# Patient Record
Sex: FEMALE | Race: WHITE | Employment: PART TIME | URBAN - METROPOLITAN AREA
[De-identification: names, ages, dates, MRNs, and addresses within clinical notes are randomized per-mention and may not be internally consistent; named-entity substitution may affect disease eponyms.]

---

## 2017-10-31 LAB — PAP-ABSTRACT: NORMAL

## 2018-10-15 ENCOUNTER — TRANSFERRED RECORDS (OUTPATIENT)
Dept: MULTI SPECIALTY CLINIC | Facility: CLINIC | Age: 23
End: 2018-10-15

## 2018-10-15 LAB
C TRACH DNA SPEC QL PROBE+SIG AMP: NEGATIVE
N GONORRHOEA DNA SPEC QL PROBE+SIG AMP: NEGATIVE
SPECIMEN DESCRIP: NORMAL
SPECIMEN DESCRIPTION: NORMAL

## 2019-08-22 ENCOUNTER — OFFICE VISIT (OUTPATIENT)
Dept: FAMILY MEDICINE | Facility: CLINIC | Age: 24
End: 2019-08-22
Payer: COMMERCIAL

## 2019-08-22 VITALS
WEIGHT: 245.75 LBS | DIASTOLIC BLOOD PRESSURE: 74 MMHG | SYSTOLIC BLOOD PRESSURE: 138 MMHG | BODY MASS INDEX: 33.29 KG/M2 | TEMPERATURE: 94.4 F | RESPIRATION RATE: 15 BRPM | OXYGEN SATURATION: 97 % | HEART RATE: 80 BPM | HEIGHT: 72 IN

## 2019-08-22 DIAGNOSIS — Z11.3 SCREEN FOR STD (SEXUALLY TRANSMITTED DISEASE): ICD-10-CM

## 2019-08-22 DIAGNOSIS — Z23 NEED FOR DIPHTHERIA-TETANUS-PERTUSSIS (TDAP) VACCINE: ICD-10-CM

## 2019-08-22 DIAGNOSIS — Z97.5 IUD (INTRAUTERINE DEVICE) IN PLACE: ICD-10-CM

## 2019-08-22 DIAGNOSIS — Z80.3 FAMILY HISTORY OF MALIGNANT NEOPLASM OF BREAST: ICD-10-CM

## 2019-08-22 DIAGNOSIS — B37.31 YEAST INFECTION OF THE VAGINA: ICD-10-CM

## 2019-08-22 DIAGNOSIS — Z12.4 SCREENING FOR MALIGNANT NEOPLASM OF CERVIX: ICD-10-CM

## 2019-08-22 DIAGNOSIS — R03.0 ELEVATED BLOOD PRESSURE READING WITHOUT DIAGNOSIS OF HYPERTENSION: ICD-10-CM

## 2019-08-22 DIAGNOSIS — N89.8 VAGINAL DISCHARGE: Primary | ICD-10-CM

## 2019-08-22 DIAGNOSIS — N76.0 BV (BACTERIAL VAGINOSIS): ICD-10-CM

## 2019-08-22 DIAGNOSIS — B96.89 BV (BACTERIAL VAGINOSIS): ICD-10-CM

## 2019-08-22 LAB
SPECIMEN SOURCE: ABNORMAL
WET PREP SPEC: ABNORMAL

## 2019-08-22 PROCEDURE — 86706 HEP B SURFACE ANTIBODY: CPT | Performed by: FAMILY MEDICINE

## 2019-08-22 PROCEDURE — 90471 IMMUNIZATION ADMIN: CPT | Performed by: FAMILY MEDICINE

## 2019-08-22 PROCEDURE — 36415 COLL VENOUS BLD VENIPUNCTURE: CPT | Performed by: FAMILY MEDICINE

## 2019-08-22 PROCEDURE — 87389 HIV-1 AG W/HIV-1&-2 AB AG IA: CPT | Performed by: FAMILY MEDICINE

## 2019-08-22 PROCEDURE — 87340 HEPATITIS B SURFACE AG IA: CPT | Performed by: FAMILY MEDICINE

## 2019-08-22 PROCEDURE — 87210 SMEAR WET MOUNT SALINE/INK: CPT | Performed by: FAMILY MEDICINE

## 2019-08-22 PROCEDURE — 86803 HEPATITIS C AB TEST: CPT | Performed by: FAMILY MEDICINE

## 2019-08-22 PROCEDURE — 90715 TDAP VACCINE 7 YRS/> IM: CPT | Performed by: FAMILY MEDICINE

## 2019-08-22 PROCEDURE — 87591 N.GONORRHOEAE DNA AMP PROB: CPT | Performed by: FAMILY MEDICINE

## 2019-08-22 PROCEDURE — 99204 OFFICE O/P NEW MOD 45 MIN: CPT | Mod: 25 | Performed by: FAMILY MEDICINE

## 2019-08-22 PROCEDURE — 86780 TREPONEMA PALLIDUM: CPT | Performed by: FAMILY MEDICINE

## 2019-08-22 PROCEDURE — 87491 CHLMYD TRACH DNA AMP PROBE: CPT | Performed by: FAMILY MEDICINE

## 2019-08-22 RX ORDER — FLUCONAZOLE 150 MG/1
150 TABLET ORAL ONCE
Qty: 1 TABLET | Refills: 0 | Status: SHIPPED | OUTPATIENT
Start: 2019-08-22 | End: 2019-08-22

## 2019-08-22 RX ORDER — COPPER 313.4 MG/1
1 INTRAUTERINE DEVICE INTRAUTERINE ONCE
COMMUNITY

## 2019-08-22 RX ORDER — METRONIDAZOLE 500 MG/1
500 TABLET ORAL 2 TIMES DAILY
Qty: 14 TABLET | Refills: 0 | Status: SHIPPED | OUTPATIENT
Start: 2019-08-22 | End: 2019-08-29

## 2019-08-22 ASSESSMENT — MIFFLIN-ST. JEOR: SCORE: 1985.68

## 2019-08-22 NOTE — NURSING NOTE
Screening Questionnaire for Adult Immunization    Are you sick today?   No   Do you have allergies to medications, food, a vaccine component or latex?   No   Have you ever had a serious reaction after receiving a vaccination?   No   Do you have a long-term health problem with heart disease, lung disease, asthma, kidney disease, metabolic disease (e.g. diabetes), anemia, or other blood disorder?   No   Do you have cancer, leukemia, HIV/AIDS, or any other immune system problem?   No   In the past 3 months, have you taken medications that affect  your immune system, such as prednisone, other steroids, or anticancer drugs; drugs for the treatment of rheumatoid arthritis, Crohn s disease, or psoriasis; or have you had radiation treatments?   No   Have you had a seizure, or a brain or other nervous system problem?   No   During the past year, have you received a transfusion of blood or blood     products, or been given immune (gamma) globulin or antiviral drug?   No   For women: Are you pregnant or is there a chance you could become        pregnant during the next month?   No   Have you received any vaccinations in the past 4 weeks?   No     Immunization questionnaire answers were all negative.        Per orders of Dr. Mili Palma, injection of  tdap given by Parth Rivas MA. Patient instructed to remain in clinic for 15 minutes afterwards, and to report any adverse reaction to me immediately.       Screening performed by Parth Rivas MA on 8/22/2019 at 3:50 PM.

## 2019-08-22 NOTE — PATIENT INSTRUCTIONS
"The lab test shows evidence of bacterial vaginosis(BV).It is one of the most common cause of vaginitis.It represents a complex change in the vaginal óscar.Approximately 50 to 75 percent of women with BV are asymptomatic. Those with symptoms present with an unpleasant, \"fishy smelling\" discharge that is more noticeable after coitus.I do recommend treatment with metronidazole orally twice a day for a week, that's been sent to the pharmacy for you to .The medication  may give metallic taste in mouth, but over all well tolerated by most. Avoid alcohol while using this medication.  Please call if any questions or concerns.   Also shows yeast   After done with flagyl take diflucan 150 mg po x 1  Avoid alcohol 1 week  Safe sex always  Abstinence and condoms only way to prevent STds and pelvic inflammatory disease  Tdap today   Return in 6 months for a physical   Sign up for mychart     Here is a list of suggestions that may help treat vaginal infections and may help maintain a healthy vaginal environment.    Many of these suggestions are for;    1. boosting your immune system so you can heal faster  2. changing the vaginal environment to a more acid state    3.  increasing the good healthy bacteria    Read through them and try the ones that seem to fit you and your life style.    Soak in a warm bath tub, no soap, no bubble bath and no oils.  Add one cup vinegar or lemon juice to bath water once in a while,     Keep a water bottle with a squirt top in your bathroom, fill with warm water and use as a spray after wiping, then pat dry.  May add 1-3 TBS vinegar to help maintain an acidic environment.    Wear cotton underwear; loose pants or skirt, no pantyhose.  No thong underwear.    Do not wear underwear to bed.  The vaginal environment needs to breathe.    Keep vaginal area dry, you can even use a hairdryer on cool setting after shower or bath    To boost your immune system increase daily intake of;  1. Rest  2. " Fluids (2-3 quarts per day),    3. Foods such as nuts, grains, raw vegetables, yogurt, faraz, grapefruit, unsweetened cranberries and juices (8 oz daily)  4. Vitamin intake (if not pregnant)              Vitamin B complex 100mg              Calcium 1000mg              Magnesium 500mg              Vitamin C 2-4 grams              Vitamin E 1,000 IU              Vitamin A 50,000 IU    Decrease daily intake of refined sugars, honey, red meat and alcohol    At each meal drink 1tsp apple cider vinegar and 1 tsp honey in   cup warm water    Acidophilus 4-5 TBS in one quart of water 3-4 times daily or as tablets 2-3 tabs 3-4 times a day    Apply plain yogurt externally to vaginal area, chamomile or chickweed cream - applied externally for relief of itching (may be found commercially).    Echinacea - 3 times a day for chronic problem or every 2 hours for acute symptoms    Take garlic daily, capsules or fresh.      Boric acid, insert 2 capsules  00  daily into vagina for seven days (found at most health food stores or co-ops)    If your symptoms do not resolve or if you have questions please call the clinic.

## 2019-08-22 NOTE — PROGRESS NOTES
Subjective     Jayshree Mandujano is a 24 year old female who presents to clinic today for the following health issues:    HPI   Vaginal Symptoms    Duration: 1 week     Description  vaginal discharge - clear and yellow  and odor    Intensity:  mild    Accompanying signs and symptoms (fever/dysuria/abdominal or back pain): None    History  Sexually active: yes, multiple partners, contraception - none  Possibility of pregnancy: No  Recent antibiotic use: no     Precipitating or alleviating factors: None    Therapies tried and outcome: none   Outcome:none    BMI > 33, ParaGard IUD in place, prior normal Pap in 2017, chlamydia 3/2017, ParaGard placed 2017, 2/2019 treated for vaginitis , resolved remote left knee pain, elevated BP, no dx of HTN, recheck always better, FH of breast cancer, prior wisdom tooth extraction & tonsillectomy here today for vaginal discharge per above past 1 week.     No fever or chills, no headache or dizziness, no double or blurry vision, no facial pain, earache, sore throat, runny nose, post nasal drip, no trouble hearing, smelling, tasting or swallowing, no cough , no chest pain, trouble breathing or palpitations, No abdominal pain, heart burn, reflux, nausea or vomiting or diarrhea or constipation, no blood in stools or black stools, no weight loss or night sweats. No dysuria, hematuria, frequency, urgency, hesitancy, incontinence, No pelvic complaints. No leg swelling or joint pain. No rash.    Agreeable to std screen. Reports HPV utd  Due for Tdap.     Patient Active Problem List   Diagnosis     BMI 33.0-33.9,adult     Elevated blood pressure reading without diagnosis of hypertension     IUD (intrauterine device) in place     Family history of malignant neoplasm of breast     Past Surgical History:   Procedure Laterality Date     HC TOOTH EXTRACTION W/FORCEP  03/2019     IUD PARAGARD  03/2017     TONSILLECTOMY  08/2016       Social History     Tobacco Use     Smoking status: Never Smoker  "    Smokeless tobacco: Never Used   Substance Use Topics     Alcohol use: Yes     Family History   Problem Relation Age of Onset     Diabetes Paternal Grandfather      Prostate Cancer Paternal Grandfather      Allergies Father      Breast Cancer Paternal Grandmother          Current Outpatient Medications   Medication Sig Dispense Refill     metroNIDAZOLE (FLAGYL) 500 MG tablet Take 1 tablet (500 mg) by mouth 2 times daily for 7 days 14 tablet 0     paragard intrauterine copper device 1 each by Intrauterine route once       Ibuprofen (ADVIL PO) Take  by mouth.       No Known Allergies  No lab results found.   BP Readings from Last 3 Encounters:   08/22/19 138/74   01/14/13 118/72 (68 %/ 61 %)*     *BP percentiles are based on the August 2017 AAP Clinical Practice Guideline for girls    Wt Readings from Last 3 Encounters:   08/22/19 111.5 kg (245 lb 12 oz)   01/14/13 88.5 kg (195 lb) (98 %)*     * Growth percentiles are based on [facility] (Girls, 2-20 Years) data.        Reviewed and updated as needed this visit by Provider  Tobacco  Allergies  Meds  Problems  Med Hx  Surg Hx  Fam Hx  Soc Hx          Review of Systems   ROS COMP: Constitutional, HEENT, cardiovascular, pulmonary, GI, , musculoskeletal, neuro, skin, endocrine and psych systems are negative, except as otherwise noted.      Objective    /74 (BP Location: Left arm, Patient Position: Chair, Cuff Size: Adult Large)   Pulse 80   Temp 94.4  F (34.7  C) (Tympanic)   Resp 15   Ht 1.835 m (6' 0.25\")   Wt 111.5 kg (245 lb 12 oz)   LMP 08/11/2019 (Exact Date)   SpO2 97%   BMI 33.10 kg/m    Body mass index is 33.1 kg/m .  Physical Exam   GENERAL: healthy, alert, no distress and obese  EYES: Eyes grossly normal to inspection, PERRL and conjunctivae and sclerae normal  HENT: ear canals and TM's normal, nose and mouth without ulcers or lesions  NECK: no adenopathy, no asymmetry, masses, or scars and thyroid normal to palpation  RESP: lungs clear to " auscultation - no rales, rhonchi or wheezes  BREAST: normal without masses, tenderness or nipple discharge and no palpable axillary masses or adenopathy  CV: regular rate and rhythm, normal S1 S2, no S3 or S4, no murmur, click or rub, no peripheral edema and peripheral pulses strong  ABDOMEN: soft, non tender, no hepatosplenomegaly, no masses and bowel sounds normal  MS: no gross musculoskeletal defects noted, no edema  SKIN: no suspicious lesions or rashes  NEURO: Normal strength and tone, mentation intact and speech normal  PSYCH: mentation appears normal, affect normal/bright    Diagnostic Test Results:  Labs reviewed in Epic  No results found for this or any previous visit (from the past 24 hour(s)).        Assessment & Plan     1. Vaginal discharge  The lab test shows evidence of bacterial vaginosis(BV) & yeast. Flagyl 500 mg bid 7 days . After done with flagyl take diflucan 150 mg po x 1. Avoid alcohol 1 week. Advised Abstinence and condoms only way to prevent STds and pelvic inflammatory disease. Advised Safe sex always. Given also a list of suggestions that may help treat vaginal infections and may help maintain a healthy vaginal environment. Will do labs on way out & sign up for my chart to get results. PA done [facility] everywhere reviewed, normal, updated in epic. Due for pap in 2020. ParaGard IUD in place. BP elevated , recheck better. With BMI > 30 & fh of breast cancer may be best to avoid especially estrogen containing birth control. Encouraged diet, exercise, healthy lifestyle. Tdap given today. Screening mammogram starting age 40. Return in 6 months for a physical with PAP.      2. BV (bacterial vaginosis)  Counseled med can cause metallic taste, brown discoloration of urine. Avoid alcohol on this med.   - metroNIDAZOLE (FLAGYL) 500 MG tablet; Take 1 tablet (500 mg) by mouth 2 times daily for 7 days  Dispense: 14 tablet; Refill: 0    3. Yeast infection of the vagina  Wait to take  "diflucan till done with flagyl & avoid alcohol one week after taking.   - fluconazole (DIFLUCAN) 150 MG tablet; Take 1 tablet (150 mg) by mouth once for 1 dose  Dispense: 1 tablet; Refill: 0    4. Screen for STD (sexually transmitted disease)  - NEISSERIA GONORRHOEA PCR  - CHLAMYDIA TRACHOMATIS PCR  - Hepatitis B Surface Antibody  - Hepatitis B surface antigen  - Hepatitis C Screen Reflex to HCV RNA Quant and Genotype  - HIV Antigen Antibody Combo  - Treponema Abs w Reflex to RPR and Titer  - Wet prep    5. Screening for malignant neoplasm of cervix  PAP due 2020.     6. IUD (intrauterine device) in place  ParaGard placed 2017.     7. Elevated blood pressure reading without diagnosis of hypertension  Recheck better. Continue to monitor. Caution with estrogen containing birth control.     8. BMI 33.0-33.9,adult  Weight loss, diet, exercise, return for a preventive physical.    9. Need for diphtheria-tetanus-pertussis (Tdap) vaccine  - TDAP VACCINE  - VACCINE ADMINISTRATION, INITIAL    10. Family history of malignant neoplasm of breast  Screening mammogram starting age 40. Caution with estrogen containing birth control.     BMI:   Estimated body mass index is 33.1 kg/m  as calculated from the following:    Height as of this encounter: 1.835 m (6' 0.25\").    Weight as of this encounter: 111.5 kg (245 lb 12 oz).   Weight management plan: Discussed healthy diet and exercise guidelines  Work on weight loss  Regular exercise    Return in about 6 months (around 2/22/2020) for Physical Exam with PCP.    Mili Palma MD  [facility]      "

## 2019-08-23 LAB
C TRACH DNA SPEC QL NAA+PROBE: NEGATIVE
HBV SURFACE AB SERPL IA-ACNC: 2.24 M[IU]/ML
HBV SURFACE AG SERPL QL IA: NONREACTIVE
HCV AB SERPL QL IA: NONREACTIVE
HIV 1+2 AB+HIV1 P24 AG SERPL QL IA: NONREACTIVE
N GONORRHOEA DNA SPEC QL NAA+PROBE: NEGATIVE
SPECIMEN SOURCE: NORMAL
SPECIMEN SOURCE: NORMAL
T PALLIDUM AB SER QL: NONREACTIVE

## 2019-08-23 NOTE — RESULT ENCOUNTER NOTE
[place] Ms. Mandujano,  Your results came back negative for syphilis. If you have any further concerns please do not hesitate to contact us by message, phone or making an appointment.  Have a good day   Dr Louie ROJAS

## 2019-08-23 NOTE — RESULT ENCOUNTER NOTE
[place] Ms. Mandujano,  Your results came back and are within acceptable limits. -HIV test is normal.  Negative for Hep B infection but not immune so consider revaccination against Hep B a series of three shots. If interested can schedule with the Medical assistant to get the series of 3 shots at 0, 2 & 4 months.. If you have any further concerns please do not hesitate to contact us by message, phone or making an appointment.  Have a good day   Dr Louie ROJAS

## 2019-08-23 NOTE — RESULT ENCOUNTER NOTE
[place] Ms. Mandujano,  Your results came back and are within acceptable limits. -Chlamydia and gonnohrea tests are normal.. If you have any further concerns please do not hesitate to contact us by message, phone or making an appointment.  Have a good day   Dr Louie ROJAS

## 2019-08-23 NOTE — RESULT ENCOUNTER NOTE
[place] Ms. Manduajno,  Your results came back and are within acceptable limits. -Hepatitis C antibody screen test shows no signs of a previous hepatitis C infection.. If you have any further concerns please do not hesitate to contact us by message, phone or making an appointment.  Have a good day   Dr Louie ROJAS

## 2019-08-24 PROBLEM — Z80.3 FAMILY HISTORY OF MALIGNANT NEOPLASM OF BREAST: Status: ACTIVE | Noted: 2019-08-24

## 2019-08-24 PROBLEM — Z97.5 IUD (INTRAUTERINE DEVICE) IN PLACE: Status: ACTIVE | Noted: 2019-08-24

## 2019-08-24 PROBLEM — R03.0 ELEVATED BLOOD PRESSURE READING WITHOUT DIAGNOSIS OF HYPERTENSION: Status: ACTIVE | Noted: 2019-08-24

## 2019-09-04 ENCOUNTER — MYC MEDICAL ADVICE (OUTPATIENT)
Dept: FAMILY MEDICINE | Facility: CLINIC | Age: 24
End: 2019-09-04

## 2019-09-04 NOTE — TELEPHONE ENCOUNTER
Appt scheduled with Dr. Martinez on 9/5/19.    Writer responded as per below.  ALEJANDRINA SmithN, RN

## 2019-09-05 ENCOUNTER — OFFICE VISIT (OUTPATIENT)
Dept: PEDIATRICS | Facility: CLINIC | Age: 24
End: 2019-09-05
Payer: COMMERCIAL

## 2019-09-05 VITALS
BODY MASS INDEX: 33.21 KG/M2 | RESPIRATION RATE: 16 BRPM | DIASTOLIC BLOOD PRESSURE: 66 MMHG | SYSTOLIC BLOOD PRESSURE: 114 MMHG | OXYGEN SATURATION: 97 % | TEMPERATURE: 98.3 F | HEART RATE: 55 BPM | WEIGHT: 246.6 LBS

## 2019-09-05 DIAGNOSIS — N89.8 VAGINAL ITCHING: Primary | ICD-10-CM

## 2019-09-05 LAB
SPECIMEN SOURCE: NORMAL
WET PREP SPEC: NORMAL

## 2019-09-05 PROCEDURE — 99213 OFFICE O/P EST LOW 20 MIN: CPT | Performed by: FAMILY MEDICINE

## 2019-09-05 PROCEDURE — 87210 SMEAR WET MOUNT SALINE/INK: CPT | Performed by: FAMILY MEDICINE

## 2019-09-05 NOTE — PROGRESS NOTES
CHIEF COMPLAINT    Vaginal discharge.      CHIEF COMPLAINT    She had recent treatment for BV plus had small amount yeast. Note reviewed. Completed treatments. Now having some thicker discharge and mild itching.     She has a Paragard IUD. Not having pelvic pains.    LMP Aug 11.    .      Patient Active Problem List   Diagnosis     BMI 33.0-33.9,adult     Elevated blood pressure reading without diagnosis of hypertension     IUD (intrauterine device) in place     Family history of malignant neoplasm of breast     Current Outpatient Medications   Medication Sig Dispense Refill     Ibuprofen (ADVIL PO) Take  by mouth.       paragard intrauterine copper device 1 each by Intrauterine route once         REVIEW OF SYSTEMS    No fever  No back pain  No dysuria      Past Medical History:   Diagnosis Date     BV (bacterial vaginosis) 10/2018     Chlamydia 2017     Left knee pain 2013       EXAM  /66 (BP Location: Right arm, Patient Position: Sitting, Cuff Size: Adult Large)   Pulse 55   Temp 98.3  F (36.8  C) (Oral)   Resp 16   Wt 111.9 kg (246 lb 9.6 oz)   LMP 2019 (Exact Date)   SpO2 97%   Breastfeeding? No   BMI 33.21 kg/m      Vaginal exam: scant whitish discharge.      Results for orders placed or performed in visit on 19   Wet prep   Result Value Ref Range    Specimen Description Vagina     Wet Prep No clue cells seen     Wet Prep No yeast seen     Wet Prep No Trichomonas seen     Wet Prep Moderate  WBC'S seen          (N89.8) Vaginal itching  (primary encounter diagnosis)  Comment:     No clear DX.  Could still be recovering, pre period.    Plan: Wet prep          Rec - observation.  Re treat if BV SX return.

## 2019-09-30 ENCOUNTER — HEALTH MAINTENANCE LETTER (OUTPATIENT)
Age: 24
End: 2019-09-30

## 2019-10-30 ENCOUNTER — OFFICE VISIT (OUTPATIENT)
Dept: ORTHOPEDICS | Facility: CLINIC | Age: 24
End: 2019-10-30
Payer: COMMERCIAL

## 2019-10-30 ENCOUNTER — ANCILLARY PROCEDURE (OUTPATIENT)
Dept: GENERAL RADIOLOGY | Facility: CLINIC | Age: 24
End: 2019-10-30
Attending: FAMILY MEDICINE
Payer: COMMERCIAL

## 2019-10-30 VITALS
DIASTOLIC BLOOD PRESSURE: 78 MMHG | HEIGHT: 72 IN | WEIGHT: 246 LBS | SYSTOLIC BLOOD PRESSURE: 118 MMHG | BODY MASS INDEX: 33.32 KG/M2

## 2019-10-30 DIAGNOSIS — M25.569 KNEE PAIN: ICD-10-CM

## 2019-10-30 DIAGNOSIS — M22.42 CHONDROMALACIA OF LEFT PATELLA: Primary | ICD-10-CM

## 2019-10-30 DIAGNOSIS — M25.562 ACUTE PAIN OF LEFT KNEE: ICD-10-CM

## 2019-10-30 PROCEDURE — 99204 OFFICE O/P NEW MOD 45 MIN: CPT | Performed by: FAMILY MEDICINE

## 2019-10-30 PROCEDURE — 73562 X-RAY EXAM OF KNEE 3: CPT | Mod: LT | Performed by: FAMILY MEDICINE

## 2019-10-30 ASSESSMENT — MIFFLIN-ST. JEOR: SCORE: 1986.82

## 2019-10-30 NOTE — PROGRESS NOTES
"ASSESSMENT & PLAN  Patient Instructions     1. Chondromalacia of left patella    2. Acute pain of left knee      -Patient has left knee pain due to inflammation of the cartilage on the undersurface of her kneecap  -Patient will start formal physical therapy and home exercise program.  -Patient may take 3 to 4 tablets of ibuprofen 2-3 times a day as needed for pain  -Patient was fitted for a patellar stabilizing brace  -Patient may continue to work as tolerated.  However patient was given restriction to limit stairs as much as possible  -We will follow-up in 4 weeks for reevaluation and progression of activity.  -Call direct clinic number [[phone]] at any time with questions or concerns.    Albert Yeo MD [facility]  [facility]          -----    SUBJECTIVE  Jayshree Jada Mandujano is a/an 24 year old female who is seen as a self referral for evaluation of left knee pain. The patient is seen by themselves. States when rolling over in bed, or lifting leg up has pain anetrolaral of patella. Having knee bent past 90 degrees feels tightness on side of patella    Onset: 1 month(s) ago. Reports insidious onset without acute precipitating event.  Location of Pain: left knee, lateral aspect of patella  Rating of Pain at worst: 8/10  Rating of Pain Currently: 2/10  Worsened by: walking for long periods of time, bending past 90 degrees, pivoting  Better with: icing, rest, elevation  Treatments tried: rest/activity avoidance, elevation, ice,  ibuprofen  Associated symptoms: tingling if \"tweaks it wrong\" in bed, then will tingle for an hour  Orthopedic history: YES - Date: mcl sprain, menisus, deep bone bruising and dislocated knee cap in 2013  Relevant surgical history: NO  Social history: social history:     Past Medical History:   Diagnosis Date     BV (bacterial vaginosis) 10/2018     Chlamydia 03/2017     Left knee pain 1/24/2013     Social History " "    Socioeconomic History     Marital status: Single     Spouse name: Not on file     Number of children: Not on file     Years of education: Not on file     Highest education level: Not on file   Occupational History     Not on file   Social Needs     Financial resource strain: Not on file     Food insecurity:     Worry: Not on file     Inability: Not on file     Transportation needs:     Medical: Not on file     Non-medical: Not on file   Tobacco Use     Smoking status: Never Smoker     Smokeless tobacco: Never Used   Substance and Sexual Activity     Alcohol use: Yes     Drug use: Never     Sexual activity: Yes     Partners: Male     Birth control/protection: None   Lifestyle     Physical activity:     Days per week: Not on file     Minutes per session: Not on file     Stress: Not on file   Relationships     Social connections:     Talks on phone: Not on file     Gets together: Not on file     Attends Congregational service: Not on file     Active member of club or organization: Not on file     Attends meetings of clubs or organizations: Not on file     Relationship status: Not on file     Intimate partner violence:     Fear of current or ex partner: Not on file     Emotionally abused: Not on file     Physically abused: Not on file     Forced sexual activity: Not on file   Other Topics Concern     Not on file   Social History Narrative     Not on file         Patient's past medical, surgical, social, and family histories were reviewed today and no changes are noted.    REVIEW OF SYSTEMS:  10 point ROS is negative other than symptoms noted above in HPI, Past Medical History or as stated below  Constitutional: NEGATIVE for fever, chills, change in weight  Skin: NEGATIVE for worrisome rashes, moles or lesions  GI/: NEGATIVE for bowel or bladder changes  Neuro: NEGATIVE for weakness, dizziness or paresthesias    OBJECTIVE:  /78   Ht 1.835 m (6' 0.25\")   Wt 111.6 kg (246 lb)   BMI 33.13 kg/m     General: " healthy, alert and in no distress  HEENT: no scleral icterus or conjunctival erythema  Skin: no suspicious lesions or rash. No jaundice.  CV: no pedal edema  Resp: normal respiratory effort without conversational dyspnea   Psych: normal mood and affect  Gait: normal steady gait with appropriate coordination and balance  Neuro: Normal light sensory exam of lower extremity  MSK:  LEFT KNEE  Inspection:    normal alignment  Palpation:    Tender about the lateral patellar facet and lateral joint line. Remainder of bony and ligamentous landmarks are nontender.    Small effusion is present    Patellofemoral crepitus is Absent  Range of Motion:     00 extension to 1250 flexion  Strength:    Quadriceps 5-/5    Extensor mechanism intact  Special Tests:    Positive: None    Negative: Patellar grind, patellar apprehension, MCL/valgus stress (0 & 30 deg), LCL/varus stress (0 & 30 deg), Lachman's, anterior drawer, posterior drawer, Robinson's    Independent visualization of the below image:  Recent Results (from the past 24 hour(s))   XR Knee Left 3 Views    Narrative    X-rays taken office today of the left knee show no acute fracture,   dislocation.  Mild lateral tilting of patella seen on sunrise view.  Small   effusion visualized.             Albert Yeo MD [facility]

## 2019-10-30 NOTE — PATIENT INSTRUCTIONS
1. Chondromalacia of left patella    2. Acute pain of left knee      -Patient has left knee pain due to inflammation of the cartilage on the undersurface of her kneecap  -Patient will start formal physical therapy and home exercise program.  -Patient may take 3 to 4 tablets of ibuprofen 2-3 times a day as needed for pain  -Patient was fitted for a patellar stabilizing brace  -Patient may continue to work as tolerated.  However patient was given restriction to limit stairs as much as possible  -We will follow-up in 4 weeks for reevaluation and progression of activity.  -Call direct clinic number [[phone]] at any time with questions or concerns.    Albert Yeo MD [facility]  [facility] [place]

## 2019-10-30 NOTE — LETTER
"    10/30/2019         RE: Jayshree Mandujano  [address] [facility] [address]        Dear Colleague,    Thank you for referring your patient, Jayshree Mandujano, to the [facility]. Please see a copy of my visit note below.    ASSESSMENT & PLAN  Patient Instructions     1. Chondromalacia of left patella    2. Acute pain of left knee      -Patient has left knee pain due to inflammation of the cartilage on the undersurface of her kneecap  -Patient will start formal physical therapy and home exercise program.  -Patient may take 3 to 4 tablets of ibuprofen 2-3 times a day as needed for pain  -Patient was fitted for a patellar stabilizing brace  -Patient may continue to work as tolerated.  However patient was given restriction to limit stairs as much as possible  -We will follow-up in 4 weeks for reevaluation and progression of activity.  -Call direct clinic number [[phone]] at any time with questions or concerns.    Albert Yeo MD [facility]  [facility]          -----    SUBJECTIVE  Jayshree Mandujano is a/an 24 year old female who is seen as a self referral for evaluation of left knee pain. The patient is seen by themselves. States when rolling over in bed, or lifting leg up has pain anetrolaral of patella. Having knee bent past 90 degrees feels tightness on side of patella    Onset: 1 month(s) ago. Reports insidious onset without acute precipitating event.  Location of Pain: left knee, lateral aspect of patella  Rating of Pain at worst: 8/10  Rating of Pain Currently: 2/10  Worsened by: walking for long periods of time, bending past 90 degrees, pivoting  Better with: icing, rest, elevation  Treatments tried: rest/activity avoidance, elevation, ice,  ibuprofen  Associated symptoms: tingling if \"tweaks it wrong\" in bed, then will tingle for an hour  Orthopedic history: YES - Date: mcl sprain, menisus, deep bone bruising and dislocated " knee cap in 2013  Relevant surgical history: NO  Social history: social history:     Past Medical History:   Diagnosis Date     BV (bacterial vaginosis) 10/2018     Chlamydia 03/2017     Left knee pain 1/24/2013     Social History     Socioeconomic History     Marital status: Single     Spouse name: Not on file     Number of children: Not on file     Years of education: Not on file     Highest education level: Not on file   Occupational History     Not on file   Social Needs     Financial resource strain: Not on file     Food insecurity:     Worry: Not on file     Inability: Not on file     Transportation needs:     Medical: Not on file     Non-medical: Not on file   Tobacco Use     Smoking status: Never Smoker     Smokeless tobacco: Never Used   Substance and Sexual Activity     Alcohol use: Yes     Drug use: Never     Sexual activity: Yes     Partners: Male     Birth control/protection: None   Lifestyle     Physical activity:     Days per week: Not on file     Minutes per session: Not on file     Stress: Not on file   Relationships     Social connections:     Talks on phone: Not on file     Gets together: Not on file     Attends [employer] service: Not on file     Active member of club or organization: Not on file     Attends meetings of clubs or organizations: Not on file     Relationship status: Not on file     Intimate partner violence:     Fear of current or ex partner: Not on file     Emotionally abused: Not on file     Physically abused: Not on file     Forced sexual activity: Not on file   Other Topics Concern     Not on file   Social History Narrative     Not on file         Patient's past medical, surgical, social, and family histories were reviewed today and no changes are noted.    REVIEW OF SYSTEMS:  10 point ROS is negative other than symptoms noted above in HPI, Past Medical History or as stated below  Constitutional: NEGATIVE for fever, chills, change in weight  Skin: NEGATIVE for worrisome  "rashes, moles or lesions  GI/: NEGATIVE for bowel or bladder changes  Neuro: NEGATIVE for weakness, dizziness or paresthesias    OBJECTIVE:  /78   Ht 1.835 m (6' 0.25\")   Wt 111.6 kg (246 lb)   BMI 33.13 kg/m      General: healthy, alert and in no distress  HEENT: no scleral icterus or conjunctival erythema  Skin: no suspicious lesions or rash. No jaundice.  CV: no pedal edema  Resp: normal respiratory effort without conversational dyspnea   Psych: normal mood and affect  Gait: normal steady gait with appropriate coordination and balance  Neuro: Normal light sensory exam of lower extremity  MSK:  LEFT KNEE  Inspection:    normal alignment  Palpation:    Tender about the lateral patellar facet and lateral joint line. Remainder of bony and ligamentous landmarks are nontender.    Small effusion is present    Patellofemoral crepitus is Absent  Range of Motion:     00 extension to 1250 flexion  Strength:    Quadriceps 5-/5    Extensor mechanism intact  Special Tests:    Positive: None    Negative: Patellar grind, patellar apprehension, MCL/valgus stress (0 & 30 deg), LCL/varus stress (0 & 30 deg), Lachman's, anterior drawer, posterior drawer, Robinson's    Independent visualization of the below image:  Recent Results (from the past 24 hour(s))   XR Knee Left 3 Views    Narrative    X-rays taken office today of the left knee show no acute fracture,   dislocation.  Mild lateral tilting of patella seen on sunrise view.  Small   effusion visualized.             Albert Yeo MD [facility]      Again, thank you for allowing me to participate in the care of your patient.        Sincerely,        Albert Yeo, MD    "

## 2019-10-30 NOTE — LETTER
October 30, 2019      Jayshree Mandujano  [address] [facility] [address]        To Whom It May Concern:    Jayshree Mandujano was seen in our clinic. She may return to work with the following: limited to light duty -please limit climbing stairs as much as possible.  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  She will follow-up in 4 weeks for reevaluation and progression of activity.      Sincerely,        Albert Yeo, MD

## 2019-11-05 ENCOUNTER — THERAPY VISIT (OUTPATIENT)
Dept: PHYSICAL THERAPY | Facility: CLINIC | Age: 24
End: 2019-11-05
Payer: COMMERCIAL

## 2019-11-05 DIAGNOSIS — M25.562 ACUTE PAIN OF LEFT KNEE: ICD-10-CM

## 2019-11-05 DIAGNOSIS — M22.42 CHONDROMALACIA OF LEFT PATELLA: ICD-10-CM

## 2019-11-05 PROCEDURE — 97112 NEUROMUSCULAR REEDUCATION: CPT | Mod: GP | Performed by: PHYSICAL THERAPIST

## 2019-11-05 PROCEDURE — 97161 PT EVAL LOW COMPLEX 20 MIN: CPT | Mod: GP | Performed by: PHYSICAL THERAPIST

## 2019-11-05 ASSESSMENT — ACTIVITIES OF DAILY LIVING (ADL)
HOW_WOULD_YOU_RATE_THE_OVERALL_FUNCTION_OF_YOUR_KNEE_DURING_YOUR_USUAL_DAILY_ACTIVITIES?: ABNORMAL
WEAKNESS: THE SYMPTOM AFFECTS MY ACTIVITY MODERATELY
SWELLING: I HAVE THE SYMPTOM BUT IT DOES NOT AFFECT MY ACTIVITY
PAIN: THE SYMPTOM AFFECTS MY ACTIVITY SLIGHTLY
HOW_WOULD_YOU_RATE_THE_CURRENT_FUNCTION_OF_YOUR_KNEE_DURING_YOUR_USUAL_DAILY_ACTIVITIES_ON_A_SCALE_FROM_0_TO_100_WITH_100_BEING_YOUR_LEVEL_OF_KNEE_FUNCTION_PRIOR_TO_YOUR_INJURY_AND_0_BEING_THE_INABILITY_TO_PERFORM_ANY_OF_YOUR_USUAL_DAILY_ACTIVITIES?: 65
STIFFNESS: THE SYMPTOM AFFECTS MY ACTIVITY MODERATELY
KNEEL ON THE FRONT OF YOUR KNEE: ACTIVITY IS VERY DIFFICULT
STAND: ACTIVITY IS NOT DIFFICULT
RAW_SCORE: 44
GIVING WAY, BUCKLING OR SHIFTING OF KNEE: THE SYMPTOM AFFECTS MY ACTIVITY MODERATELY
GO DOWN STAIRS: ACTIVITY IS FAIRLY DIFFICULT
GO UP STAIRS: ACTIVITY IS MINIMALLY DIFFICULT
LIMPING: I HAVE THE SYMPTOM BUT IT DOES NOT AFFECT MY ACTIVITY
RISE FROM A CHAIR: ACTIVITY IS NOT DIFFICULT
KNEE_ACTIVITY_OF_DAILY_LIVING_SCORE: 62.86
AS_A_RESULT_OF_YOUR_KNEE_INJURY,_HOW_WOULD_YOU_RATE_YOUR_CURRENT_LEVEL_OF_DAILY_ACTIVITY?: NEARLY NORMAL
KNEE_ACTIVITY_OF_DAILY_LIVING_SUM: 44
SIT WITH YOUR KNEE BENT: ACTIVITY IS FAIRLY DIFFICULT
SQUAT: ACTIVITY IS SOMEWHAT DIFFICULT
WALK: ACTIVITY IS NOT DIFFICULT

## 2019-11-05 NOTE — PROGRESS NOTES
[facility]: Physical Therapy Initial Evaluation   Nov 5, 2019  Subjective:   Chief Complaint: Left knee pain   Pain: outside of the left knee   Numbness/Tingling: tingling only when tweaked really bad   Weakness: none   Stiffness: none   Other: hasn't noticed much swelling, but was told there was some  New/Recurrent/Chronic: Recurrent (issues in 2013)  DOI/onset: 2 months ago  Referral Date: 10/30/2019 - Yeo, Albert, MD  Mechanism of onset: insidious onset  PMH/surgical history/trauma: No significant medical history other than the previous knee injury  General health as reported by patient: Excellent-good    Medications: Ibuprofen  Occupation: Photography, bartending Job duties: lifting/carrying, prolonged standing,  Previous Treatment (Effect): ibuprofen (not really helpful), patellar brace (helped a little bit)  Imaging: X-rays taken of the left knee show no acute fracture, dislocation. Mild lateral tilting of patella seen on sunrise view. Small effusion visualized.   AM/PM: a little sore in the morning, worse as the day goes on  Quality of Pain: sore, throbbing  Pain: 4/10 at present, 2/10 at best, 8/10 at worst  Worse: stairs, sudden movements (turning in bed), bending, kneeling,   Better: icing,   Progression of Symptoms since onset: getting worse   Sleeping: last night was the first night it kept her awake.    Current Functional Status:   - stairs - can get more sore with stairs at work.    - turning in bed - can get sudden, sharp pain  - workouts - not doing squats, lunges, or any other lower body exercises with weight.   Previous Functional Status: no restrictions  Current HEP/exercise regimen: workouts on hold  Transportation to Therapy: Independent with transportation  Live with Others: lives with roommates,   Red Flags:   - Patient denies the following: Locking, Catching (knee); Weakness ;      Patient's goal(s): get it not to hurt anymore. Learn exercises to do.          Objective:    Standing Posture: bilateral increase in supination with standing posture    Gait: hip drop, mild bilaterally.     SL Balance: WNL     Functional:   - Stairs: pain was better, but not abolished with taping.     Swelling:    R L   Joint Line 39.5 cm 39 cm         AROM: (* indicates patient's pain)   PROM:(over pressure)   R  L R L   Hyperextension 5 6     Extension 0 0     Flexion 135 117*       Strength:   R L   HIP     Flex 5 5   Abd 5 5   Ext 5 4+*   KNEE     Ext 5 5-*   Flex 5 5       Special tests:   R L   Sweep Test  0+   Patellar Compression  (+)     Palpation: Tenderness to palpation of the lateral facet of the left patella    Patellar tracking, positioning, mobility: Lateral tilt of the patellae, left more than right    Other:  - Overall pain improved with taping, but not abolished  - Brace was also helpful, reducing pain, but not abolishing it      Assessment/Plan:    Patient is a 24 year old female with left side knee complaints.    Patient has the following significant findings with corresponding treatment plan.                Referring Diagnosis: Chondromalacia of left patella ; Acute pain of left knee   Pain -  hot/cold therapy, manual therapy, splint/taping/bracing/orthotics, self management, education and home program  Decreased ROM/flexibility - manual therapy, therapeutic exercise, therapeutic activity and home program  Decreased strength - therapeutic exercise, therapeutic activities and home program  Impaired gait - gait training and home program  Decreased function - therapeutic activities and home program  Impaired posture - neuro re-education, therapeutic activities and home program      Therapy Evaluation Codes:   1) History comprised of:   Personal factors that impact the plan of care:      Profession.    Comorbidity factors that impact the plan of care are:      Previous knee dislocation/injury.     Medications impacting care: None.  2) Examination of Body Systems  comprised of:   Body structures and functions that impact the plan of care:      Hip and Knee.   Activity limitations that impact the plan of care are:      Sports and Stairs.  3) Clinical presentation characteristics are:   Stable/Uncomplicated.  4) Decision-Making    Low complexity using standardized patient assessment instrument and/or measureable assessment of functional outcome.  Cumulative Therapy Evaluation is: Low complexity.    Previous and current functional limitations:  (See Goal Flow Sheet for this information)    Short term and Long term goals: (See Goal Flow Sheet for this information)     Communication ability:  Patient appears to be able to clearly communicate and understand verbal and written communication and follow directions correctly.  Treatment Explanation - The following has been discussed with the patient:   RX ordered/plan of care  Anticipated outcomes  Possible risks and side effects  This patient would benefit from PT intervention to resume normal activities.   Rehab potential is good.    Frequency:  1 X week, once daily  Duration:  for 4 weeks tapering to 2 X a month over 4 weeks  Discharge Plan:  Achieve all LTG.  Independent in home treatment program.  Reach maximal therapeutic benefit.    Please refer to the daily flowsheet for treatment today, total treatment time and time spent performing 1:1 timed codes.

## 2019-11-13 ENCOUNTER — THERAPY VISIT (OUTPATIENT)
Dept: PHYSICAL THERAPY | Facility: CLINIC | Age: 24
End: 2019-11-13
Payer: COMMERCIAL

## 2019-11-13 DIAGNOSIS — M25.562 ACUTE PAIN OF LEFT KNEE: ICD-10-CM

## 2019-11-13 PROCEDURE — 97110 THERAPEUTIC EXERCISES: CPT | Mod: GP | Performed by: PHYSICAL THERAPIST

## 2019-11-13 PROCEDURE — 97112 NEUROMUSCULAR REEDUCATION: CPT | Mod: GP | Performed by: PHYSICAL THERAPIST

## 2019-12-26 NOTE — PROGRESS NOTES
DISCHARGE REPORT    Updated as of December 26, 2019  Progress reporting period is from Nov 5, 2019 to Nov 13, 2019.       SUBJECTIVE  Subjective changes noted by patient:  Unknown. Patient has failed to return to clinic.    Current pain level is unknown. Patient has failed to return to clinic.  .     Initial Pain level: 8/10.   Changes in function:  Unknown. Patient has failed to return to clinic.  Adverse reaction to treatment or activity: Unknown. Patient has failed to return to clinic.    OBJECTIVE  Changes noted in objective findings:  Patient has failed to return to therapy so current objective findings are unknown.    ASSESSMENT/PLAN  Updated problem list and treatment plan: Diagnosis 1:  Chondromalacia of left patella ; Acute pain of left knee   STG/LTGs have been met or progress has been made towards goals:  Unknown. Patient has failed to return to clinic.  Assessment of Progress: The patient has not returned to therapy. Current status is unknown.  Self Management Plans:  Patient has been instructed in a home treatment program.  Patient continues to require the following intervention to meet STG and LTG's:  Unknown. Patient has failed to return to clinic.    Recommendations:  Unable to make an accurate recommendation at this time. Patient has failed to return to clinic.    Please refer to the daily flowsheet for treatment today, total treatment time and time spent performing 1:1 timed codes.

## 2020-05-01 ENCOUNTER — MYC MEDICAL ADVICE (OUTPATIENT)
Dept: FAMILY MEDICINE | Facility: CLINIC | Age: 25
End: 2020-05-01

## 2021-01-15 ENCOUNTER — HEALTH MAINTENANCE LETTER (OUTPATIENT)
Age: 26
End: 2021-01-15

## 2021-10-24 ENCOUNTER — HEALTH MAINTENANCE LETTER (OUTPATIENT)
Age: 26
End: 2021-10-24

## 2022-02-13 ENCOUNTER — HEALTH MAINTENANCE LETTER (OUTPATIENT)
Age: 27
End: 2022-02-13

## 2022-10-16 ENCOUNTER — HEALTH MAINTENANCE LETTER (OUTPATIENT)
Age: 27
End: 2022-10-16

## 2023-03-26 ENCOUNTER — HEALTH MAINTENANCE LETTER (OUTPATIENT)
Age: 28
End: 2023-03-26

## 2023-12-05 ENCOUNTER — LAB REQUISITION (OUTPATIENT)
Dept: LAB | Facility: CLINIC | Age: 28
End: 2023-12-05
Payer: COMMERCIAL

## 2023-12-05 DIAGNOSIS — Z12.4 ENCOUNTER FOR SCREENING FOR MALIGNANT NEOPLASM OF CERVIX: ICD-10-CM

## 2023-12-05 PROCEDURE — G0145 SCR C/V CYTO,THINLAYER,RESCR: HCPCS | Mod: ORL | Performed by: STUDENT IN AN ORGANIZED HEALTH CARE EDUCATION/TRAINING PROGRAM

## 2023-12-08 LAB
BKR LAB AP GYN ADEQUACY: NORMAL
BKR LAB AP GYN INTERPRETATION: NORMAL
BKR LAB AP HPV REFLEX: NORMAL
BKR LAB AP LMP: NORMAL
BKR LAB AP PREVIOUS ABNL DX: NORMAL
BKR LAB AP PREVIOUS ABNORMAL: NORMAL
PATH REPORT.COMMENTS IMP SPEC: NORMAL
PATH REPORT.COMMENTS IMP SPEC: NORMAL
PATH REPORT.RELEVANT HX SPEC: NORMAL

## 2025-01-09 ENCOUNTER — LAB REQUISITION (OUTPATIENT)
Dept: LAB | Facility: CLINIC | Age: 30
End: 2025-01-09
Payer: COMMERCIAL

## 2025-01-09 DIAGNOSIS — N76.0 ACUTE VAGINITIS: ICD-10-CM

## 2025-01-09 PROCEDURE — 81515 NFCT DS BV&VAGINITIS DNA ALG: CPT | Mod: ORL | Performed by: STUDENT IN AN ORGANIZED HEALTH CARE EDUCATION/TRAINING PROGRAM

## 2025-01-10 LAB
BACTERIAL VAGINOSIS VAG-IMP: NEGATIVE
CANDIDA DNA VAG QL NAA+PROBE: NOT DETECTED
CANDIDA GLABRATA / CANDIDA KRUSEI DNA: NOT DETECTED
T VAGINALIS DNA VAG QL NAA+PROBE: NOT DETECTED